# Patient Record
Sex: MALE | Race: WHITE
[De-identification: names, ages, dates, MRNs, and addresses within clinical notes are randomized per-mention and may not be internally consistent; named-entity substitution may affect disease eponyms.]

---

## 2019-01-18 ENCOUNTER — HOSPITAL ENCOUNTER (EMERGENCY)
Dept: HOSPITAL 25 - UCCORT | Age: 49
Discharge: HOME | End: 2019-01-18
Payer: COMMERCIAL

## 2019-01-18 VITALS — DIASTOLIC BLOOD PRESSURE: 68 MMHG | SYSTOLIC BLOOD PRESSURE: 120 MMHG

## 2019-01-18 DIAGNOSIS — F17.210: ICD-10-CM

## 2019-01-18 DIAGNOSIS — M54.30: Primary | ICD-10-CM

## 2019-01-18 PROCEDURE — G0463 HOSPITAL OUTPT CLINIC VISIT: HCPCS

## 2019-01-18 PROCEDURE — 99212 OFFICE O/P EST SF 10 MIN: CPT

## 2019-01-18 PROCEDURE — 96372 THER/PROPH/DIAG INJ SC/IM: CPT

## 2019-01-18 NOTE — UC
Back Pain HPI





- HPI Summary


HPI Summary: 





Pt c/o gradual onset of worsening right lower back/buttock pain that began last 

night.  Pt has had two previous back surgeries for herniated discs. 





- History of Current Complaint


Chief Complaint: UCBackPain


Stated Complaint: LOWER RIGHT SIDE BACK PAIN


Time Seen by Provider: 01/18/19 17:35


Hx Obtained From: Patient


Onset/Duration: Sudden Onset, Lasting Hours, Still Present, Worse Since - onset


Timing: Constant, Lasting Hours


Severity Initially: Moderate


Severity Currently: Severe


Pain Intensity: 10


Back Pain: Is Discrete @ - right upper medial buttock


Character: Sharp, Dull, Aching, Throbbing, Spasmodic, Stiffness, Burning


Aggravating Factor(s): Movement


Alleviating Factor(s): Position


Associated Signs And Symptoms: Positive: Negative





- Risk Factors


AAA Risk Factors: Negative


TAD Risk Factors: Negative


Cauda Equina Risk Factors: Negative


Epidural Abscess Risk Factors: Negative





- Allergies/Home Medications


Allergies/Adverse Reactions: 


 Allergies











Allergy/AdvReac Type Severity Reaction Status Date / Time


 


No Known Allergies Allergy   Verified 01/18/19 17:16











Home Medications: 


 Home Medications





Naproxen Sodium [Aleve] 440 mg PO DAILY 01/18/19 [History Confirmed 01/18/19]











PMH/Surg Hx/FS Hx/Imm Hx


Previously Healthy: Yes





- Surgical History


Surgical History: Yes


Surgery Procedure, Year, and Place: back surgery - 2000 & 2016 L4-L5





- Family History


Known Family History: Positive: Cardiac Disease, Hypertension





- Social History


Occupation: Employed Full-time


Lives: With Family


Alcohol Use: None


Alcohol Amount: 12 PACK/2 WEEK


Substance Use Type: None


Smoking Status (MU): Heavy Every Day Tobacco Smoker


Type: Cigarettes


Amount Used/How Often: 1/2/ppd FOR 15 YEARS


Have You Smoked in the Last Year: Yes





Review of Systems


All Other Systems Reviewed And Are Negative: Yes


Constitutional: Positive: Negative


Skin: Positive: Negative


Eyes: Positive: Negative


ENT: Positive: Negative


Respiratory: Positive: Negative


Cardiovascular: Positive: Negative


Gastrointestinal: Positive: Negative


Genitourinary: Positive: Negative


Motor: Positive: Decreased ROM - right lower extremity, low back


Neurovascular: Positive: Negative


Musculoskeletal: Positive: Arthralgia, Decreased ROM, Myalgia


Neurological: Positive: Negative


Psychological: Positive: Negative


Is Patient Immunocompromised?: No





Physical Exam


Triage Information Reviewed: Yes


Appearance: Pain Distress


Vital Signs: 


 Initial Vital Signs











Temp  99.1 F   01/18/19 17:12


 


Pulse  88   01/18/19 17:12


 


Resp  18   01/18/19 17:12


 


BP  120/68   01/18/19 17:12


 


Pulse Ox  99   01/18/19 17:12











Vital Signs Reviewed: Yes


Eye Exam: Normal


ENT Exam: Normal


Dental Exam: Normal


Neck exam: Normal


Respiratory Exam: Normal


Respiratory: Positive: No respiratory distress


Musculoskeletal: Positive: ROM Limited @ - low back, pain at upper medial 

buttock, upper sciatic


Neurological Exam: Normal


Psychological Exam: Normal


Skin Exam: Normal





Back Pain Course/Dx





- Differential Dx/Diagnosis


Differential Diagnosis/HQI/PQRI: Epidural Abscess, Herniated Disc, Strain, 

Sprain


Provider Diagnosis: 


 Sciatic nerve pain








Discharge





- Sign-Out/Discharge


Documenting (check all that apply): Patient Departure


All imaging exams completed and their final reports reviewed: No Studies





- Discharge Plan


Condition: Stable


Disposition: HOME


Prescriptions: 


Cyclobenzaprine TAB* [Flexeril 10 MG TAB*] 10 mg PO Q8H PRN #21 tab


 PRN Reason: Pain


predniSONE TAB* [Deltasone 10 MG TAB*] 30 mg PO DAILY #12 tab


Patient Education Materials:  Sciatica (ED), Lower Back Exercises (ED)


Referrals: 


Oscar Escobar MD [Primary Care Provider] - As Soon As Possible





- Billing Disposition and Condition


Condition: STABLE


Disposition: Home





- Attestation Statements


Provider Attestation: 





I was available for consult. This patient was seen by the [facility]. The patient was 

not presented to, seen by, or examined by me. EK